# Patient Record
Sex: MALE | Race: WHITE | NOT HISPANIC OR LATINO | Employment: FULL TIME | URBAN - METROPOLITAN AREA
[De-identification: names, ages, dates, MRNs, and addresses within clinical notes are randomized per-mention and may not be internally consistent; named-entity substitution may affect disease eponyms.]

---

## 2017-06-09 ENCOUNTER — GENERIC CONVERSION - ENCOUNTER (OUTPATIENT)
Dept: OTHER | Facility: OTHER | Age: 52
End: 2017-06-09

## 2017-11-10 ENCOUNTER — GENERIC CONVERSION - ENCOUNTER (OUTPATIENT)
Dept: OTHER | Facility: OTHER | Age: 52
End: 2017-11-10

## 2017-11-16 ENCOUNTER — GENERIC CONVERSION - ENCOUNTER (OUTPATIENT)
Dept: OTHER | Facility: OTHER | Age: 52
End: 2017-11-16

## 2017-11-22 RX ORDER — SIMVASTATIN 10 MG
10 TABLET ORAL
COMMUNITY

## 2017-11-27 RX ORDER — IRBESARTAN 150 MG/1
150 TABLET ORAL
COMMUNITY

## 2017-11-28 ENCOUNTER — GENERIC CONVERSION - ENCOUNTER (OUTPATIENT)
Dept: OTHER | Facility: OTHER | Age: 52
End: 2017-11-28

## 2017-11-28 ENCOUNTER — ANESTHESIA (OUTPATIENT)
Dept: PERIOP | Facility: HOSPITAL | Age: 52
End: 2017-11-28
Payer: COMMERCIAL

## 2017-11-28 ENCOUNTER — ANESTHESIA EVENT (OUTPATIENT)
Dept: PERIOP | Facility: HOSPITAL | Age: 52
End: 2017-11-28
Payer: COMMERCIAL

## 2017-11-28 ENCOUNTER — HOSPITAL ENCOUNTER (OUTPATIENT)
Facility: HOSPITAL | Age: 52
Setting detail: OUTPATIENT SURGERY
Discharge: HOME | End: 2017-11-28
Attending: INTERNAL MEDICINE | Admitting: INTERNAL MEDICINE
Payer: COMMERCIAL

## 2017-11-28 VITALS
TEMPERATURE: 97.8 F | OXYGEN SATURATION: 94 % | RESPIRATION RATE: 20 BRPM | HEART RATE: 58 BPM | BODY MASS INDEX: 27.36 KG/M2 | DIASTOLIC BLOOD PRESSURE: 86 MMHG | SYSTOLIC BLOOD PRESSURE: 127 MMHG | HEIGHT: 72 IN | WEIGHT: 202 LBS

## 2017-11-28 RX ORDER — SODIUM CHLORIDE, SODIUM LACTATE, POTASSIUM CHLORIDE, CALCIUM CHLORIDE 600; 310; 30; 20 MG/100ML; MG/100ML; MG/100ML; MG/100ML
50 INJECTION, SOLUTION INTRAVENOUS CONTINUOUS
Status: DISCONTINUED | OUTPATIENT
Start: 2017-11-28 | End: 2017-11-28 | Stop reason: HOSPADM

## 2017-11-28 RX ORDER — PROPOFOL 10 MG/ML
INJECTION, EMULSION INTRAVENOUS AS NEEDED
Status: DISCONTINUED | OUTPATIENT
Start: 2017-11-28 | End: 2017-11-28 | Stop reason: SURG

## 2017-11-28 RX ADMIN — PROPOFOL 40 MG: 10 INJECTION, EMULSION INTRAVENOUS at 10:00

## 2017-11-28 RX ADMIN — PROPOFOL 50 MG: 10 INJECTION, EMULSION INTRAVENOUS at 09:54

## 2017-11-28 RX ADMIN — PROPOFOL 50 MG: 10 INJECTION, EMULSION INTRAVENOUS at 10:03

## 2017-11-28 RX ADMIN — PROPOFOL 100 MG: 10 INJECTION, EMULSION INTRAVENOUS at 09:51

## 2017-11-28 RX ADMIN — SODIUM CHLORIDE, SODIUM LACTATE, POTASSIUM CHLORIDE, AND CALCIUM CHLORIDE: .6; .31; .03; .02 INJECTION, SOLUTION INTRAVENOUS at 09:48

## 2017-11-28 RX ADMIN — PROPOFOL 50 MG: 10 INJECTION, EMULSION INTRAVENOUS at 09:57

## 2017-11-28 RX ADMIN — SODIUM CHLORIDE, SODIUM LACTATE, POTASSIUM CHLORIDE, AND CALCIUM CHLORIDE 50 ML/HR: .6; .31; .03; .02 INJECTION, SOLUTION INTRAVENOUS at 09:22

## 2017-11-28 NOTE — OP NOTE
**** GI/ENDOSCOPY REPORT ****     PATIENT NAME: Nuris Velázquez ------ VISIT ID:  Patient ID:   [id number] YOB: 1965     INTRODUCTION: Colonoscopy - A 50 male patient presents for an outpatient   Colonoscopy at [address] [place] [address]  PREVIOUS COLONOSCOPY: none     INDICATIONS: Fecal occult blood positive  CONSENT:  The benefits, risks, and alternatives to the procedure were   discussed and informed consent was obtained from the patient  PREPARATION: EKG, pulse, pulse oximetry and blood pressure were monitored   throughout the procedure  The patient was identified by myself both   verbally and by visual inspection of ID band  Airway Assessment   Classification: Airway class 2 - Visualization of the soft palate, fauces   and uvula  ASA Classification: Class 2 - Patient has mild to moderate   systemic disturbance that may or may not be related to the disorder   requiring surgery  MEDICATIONS: Anesthesia-check records     PROCEDURE:  The endoscope was passed without difficulty through the anus   under direct visualization and advanced to the cecum, confirmed by   appendiceal orifice and ileocecal valve  The scope was withdrawn and the   mucosa was carefully examined  The quality of the preparation was   excellent  Cecal Intubation Time: 3 minutes(s) Scope Withdrawal Time: 4   minutes(s)     RECTAL EXAM: Normal rectal exam      FINDINGS:  The colonoscopy examination was completely normal  Normal   retroversion     COMPLICATIONS: There were no complications  IMPRESSIONS: Normal colonoscopy  RECOMMENDATIONS: Colonoscopy recommended in 10 years  Yearly FIT test with   PCP Every 3 year Cologuard with PCP     ESTIMATED BLOOD LOSS: None       PATHOLOGY SPECIMENS: No     PROCEDURE CODES: Colonoscopy     ICD-9 Codes: 792 1 Nonspecific abnormal findings in stool contents     ICD-10 Codes: R19 5 Other fecal abnormalities     PERFORMED BY: ANGEL Kennedy  [facility]  on 11/28/2017  Version 1, electronically signed by ANGEL Jin , D O  on   11/28/2017 at 10:10

## 2017-11-28 NOTE — DISCHARGE INSTRUCTIONS
Upper Endoscopy   AMBULATORY CARE:   What you need to know about an upper endoscopy: An upper endoscopy is also called an upper gastrointestinal (GI) endoscopy, or an esophagogastroduodenoscopy (EGD)  A scope (thin, flexible tube with a light and camera) is used to examine the walls of your upper intestines  The upper intestines include the esophagus, stomach, and duodenum (first part of the small intestine)  An upper endoscopy is used to look for problems, such as bleeding, polyps, ulcers, or infection  How to prepare for an upper endoscopy: Your healthcare provider will talk to you about how to prepare for your procedure  You may need to not eat or drink anything except water for 6 to 12 hours before the procedure  He will tell you what medicines to take or not take on the day of your procedure  Arrange to have someone drive you home  What will happen during an upper endoscopy:   · You will be given medicine through your IV to help you relax and make you drowsy  You will also be given medicine to numb your throat  You may need to wear a plastic mouthpiece to help hold your mouth open and protect your teeth and tongue  Your healthcare provider will gently insert the endoscope through your mouth and down into your throat  You may be asked to swallow once to help move the scope into your upper intestines  You may feel pressure in your throat but you should not feel pain  The endoscope does not restrict your breathing  · Your healthcare provider will watch the scope on a monitor  He will take pictures with the scope  He may gently inject air so he can see your digestive tract clearly  Your healthcare provider may take tissue samples and send them to the lab for tests  He may remove foreign objects, tumors, or polyps that may be blocking your upper intestines  Your healthcare provider may also insert tools with the scope to treat bleeding or place a stent (tube)   When the procedure is finished, the endoscope will be slowly removed  What will happen after an upper endoscopy: You may feel bloated, gassy, or have some abdominal discomfort  Your throat may be sore for 24 to 36 hours after the procedure  You may burp or pass gas from air that is still inside your body after your procedure  You may need to take short walks to help move the gas out  Eat small meals, if you feel bloated  Do not drive or make important decisions until the day after your procedure  Risks of an upper endoscopy: Your esophagus, stomach, or duodenum may be punctured or torn during the procedure  This is because of increased pressure as the scope and air are passing through  You may bleed more than expected or get an infection  You may have a slow or irregular heartbeat, or low blood pressure  This can cause sweating and fainting  Fluid may enter your lungs and you may have trouble breathing  These problems can be life-threatening  Call 911 if:   · You have sudden chest pain or trouble breathing  Seek care immediately if:   · You feel dizzy or faint  · You have trouble swallowing  · You have severe throat pain  · Your bowel movements are very dark or black  · Your abdomen is hard and firm and you have severe pain  · You vomit blood  Contact your healthcare provider if:   · You feel full or bloated and cannot burp or pass gas  · You have not had a bowel movement for 3 days after your procedure  · You have neck pain  · You have a fever or chills  · You have nausea or are vomiting  · You have a rash or hives  · You have questions or concerns about your endoscopy  Relieve a sore throat:  Suck on throat lozenges or crushed ice  Gargle with a small amount of warm salt water  Mix 1 teaspoon of salt and 1 cup of warm water to make salt water  Relieve gas and discomfort from bloating:  Lie on your right side with a heating pad on your abdomen  Take short walks to help pass gas   Eat small meals until bloating is relieved  Rest after your procedure: You have been given medicine to relax you  Do not  drive or make important decisions until the day after your procedure  Return to your normal activity as directed  You can usually return to work the day after your procedure  Follow up with your healthcare provider as directed:  Write down your questions so you remember to ask them during your visits  © 2017 [address] [employer] Information is for End User's use only and may not be sold, redistributed or otherwise used for commercial purposes  All illustrations and images included in CareNotes® are the copyrighted property of A [employer]  or [employer]  The above information is an  only  It is not intended as medical advice for individual conditions or treatments  Talk to your doctor, nurse or pharmacist before following any medical regimen to see if it is safe and effective for you  Colonoscopy   WHAT YOU NEED TO KNOW:   What do I need to know about a colonoscopy? A colonoscopy is a procedure to examine the inside of your colon (intestine) with a scope  A scope is a flexible tube with a small light and camera on the end  Polyps or tissue growths may be removed during your colonoscopy  How should I prepare the week before my colonoscopy? You will need to stop taking medicines that contain aspirin or iron for 7 days before your colonoscopy  If you take anticoagulants, such as warfarin, ask when you should stop taking it  Make plans for someone to drive you home after your procedure  How should I prepare the day before my colonoscopy? Your healthcare provider will have you prepare your bowels before your procedure  Your bowels will need to be empty before your procedure to allow him to clearly see your colon  You will need to do the following the day before your procedure:  · Have only clear liquids  for the entire day before your colonoscopy   Clear liquid diet includes clear fruit juices and broths, clear flavored gelatin, and hard candy  It also includes coffee, tea, carbonated beverages, and clear sports drinks  · Follow your bowel prep as directed  There are many different preparations that can be given before a colonoscopy  Some are given over 2 hours and others over 6 hours  Some are given earlier in the afternoon the day before the colonoscopy  Others are given the day before and then the morning of the colonoscopy  With any bowel prep, stay close to the bathroom  This liquid will cause your bowels to move frequently  · An enema  may be needed  Your healthcare provider may tell you to use an enema to help clean out your bowels  · Do not eat or drink anything after midnight  This will help prevent problems that can happen if you vomit while under anesthesia  What will happen during my colonoscopy? · You will be given medicine to help you relax  You will lie on your left side and raise one or both knees toward your chest  Your healthcare provider will examine your anus and use a finger to check your rectum  You may need another enema if your bowel is not empty  The scope will be lubricated and gently placed into your anus  It will then be passed through your rectum and into your colon  Water or air will be put into your colon to help clean or expand it  This is done so your healthcare provider can see your colon clearly  · Tissue samples may be taken from the walls of your bowel and sent to a lab for tests  If you have a polyp, your healthcare provider will pass a wire loop through the scope and use it to hold the polyp  The polyp is then burned or cut off the wall of your colon  Removed polyps are sent to a lab for tests  Pictures of your colon may be taken during the procedure  The scope will be removed when the procedure is done  What will happen after my colonoscopy? · Rest after your procedure   You may feel bloated, have some gas and abdominal discomfort  You may need to lie on your right side with a heating pad on your abdomen  You may need to take short walks to help move the gas out  Eat small meals, if you feel bloated  Do not drive or make important decisions until the day after your procedure  · You may have polyps removed  Do not take aspirin or go on long car trips for 7 days after your procedure  Ask your healthcare provider about any other limits after your procedure  What are the risks of a colonoscopy? You may have pain or bleeding after the scope or polyps are removed  You may also have a slow heartbeat, decreased blood pressure, or increased sweating  Your colon may tear due to the increased pressure from the scope and other instruments  This may cause bowel contents to leak out of your colon and into your abdomen  If this happens, you will need to stay in the hospital and have surgery on your colon  CARE AGREEMENT:   You have the right to help plan your care  Learn about your health condition and how it may be treated  Discuss treatment options with your caregivers to decide what care you want to receive  You always have the right to refuse treatment  The above information is an  only  It is not intended as medical advice for individual conditions or treatments  Talk to your doctor, nurse or pharmacist before following any medical regimen to see if it is safe and effective for you  © 2017 [address] [employer] is for End User's use only and may not be sold, redistributed or otherwise used for commercial purposes  All illustrations and images included in CareNotes® are the copyrighted property of [employer]  or [employer]

## 2017-11-28 NOTE — ANESTHESIA POSTPROCEDURE EVALUATION
Post-Op Assessment Note      CV Status:  Stable    Mental Status:  Lethargic    Hydration Status:  Stable    PONV Controlled:  None    Airway Patency:  Patent and adequate    Post Op Vitals Reviewed: Yes          Staff: Anesthesiologist, CRNA           /73 (11/28/17 1008)    Temp     Pulse 68 (11/28/17 1008)   Resp 16 (11/28/17 1008)    SpO2 96 % (11/28/17 1008)

## 2017-11-28 NOTE — ANESTHESIA PREPROCEDURE EVALUATION
Review of Systems/Medical History  Patient summary reviewed  Chart reviewed      Cardiovascular  Negative cardio ROS Hyperlipidemia, Hypertension controlled,    Pulmonary  Negative pulmonary ROS ,        GI/Hepatic  Negative GI/hepatic ROS          Negative  ROS        Endo/Other  Negative endo/other ROS      GYN  Negative gynecology ROS          Hematology  Negative hematology ROS      Musculoskeletal  Negative musculoskeletal ROS        Neurology  Negative neurology ROS      Psychology   Negative psychology ROS            Physical Exam    Airway    Mallampati score: II  TM Distance: >3 FB  Neck ROM: full     Dental   No notable dental hx     Cardiovascular  Comment: Negative ROS, Rhythm: regular, Rate: normal, Cardiovascular exam normal    Pulmonary  Pulmonary exam normal Breath sounds clear to auscultation,     Other Findings        Anesthesia Plan  ASA Score- 2       Anesthesia Type- IV sedation with anesthesia with ASA Monitors  Additional Monitors:   Airway Plan:           Induction- intravenous  Informed Consent- Anesthetic plan and risks discussed with patient

## 2017-11-28 NOTE — OP NOTE
**** GI/ENDOSCOPY REPORT ****     PATIENT NAME: Jude Diaz - VISIT ID:  Patient ID: [id number]   YOB: 1965     INTRODUCTION: Esophagogastroduodenoscopy - A 50 male patient presents for   an outpatient Esophagogastroduodenoscopy at [facility]  INDICATIONS: Fecal occult blood positive  CONSENT: The benefits, risks, and alternatives to the procedure were   discussed and informed consent was obtained from the patient  PREPARATION:  EKG, pulse, pulse oximetry and blood pressure were monitored   throughout the procedure  MEDICATIONS:asa 2     PROCEDURE:  The endoscope was passed without difficulty through the mouth   under direct visualization and advanced to the 3rd portion of the   duodenum  The scope was withdrawn and the mucosa was carefully examined  FINDINGS:  The EGD examination was completely normal   Esophagus: The   esophagus appeared to be normal  The Z line was visualized at 41 cm from   the entry site  Stomach: The antrum, body of the stomach, cardia, fundus,   incisura, and pylorus appeared to be normal   Duodenum: The duodenal bulb,   2nd portion of the duodenum, and 3rd portion of the duodenum appeared to   be normal      COMPLICATIONS: There were no complications  IMPRESSIONS: Normal EGD  Normal esophagus  Z line visualized  Normal   antrum, body of the stomach, cardia, fundus, incisura, and pylorus  Normal   duodenal bulb, 2nd portion of the duodenum, and 3rd portion of the   duodenum  RECOMMENDATIONS: Continue current medications  ESTIMATED BLOOD LOSS: None  PATHOLOGY SPECIMENS: No     PROCEDURE CODES: 15256 - EGD flexible; incl brushing or washing     ICD-9 Codes: 792 1 Nonspecific abnormal findings in stool contents     ICD-10 Codes: R19 5 Other fecal abnormalities     PERFORMED BY: ANGEL Mccall  [facility]  on 11/28/2017    Version 1, electronically signed by ANGEL Noble , D O  on   11/28/2017 at 09:59

## 2017-12-21 ENCOUNTER — GENERIC CONVERSION - ENCOUNTER (OUTPATIENT)
Dept: OTHER | Facility: OTHER | Age: 52
End: 2017-12-21

## 2018-01-22 VITALS
WEIGHT: 206 LBS | SYSTOLIC BLOOD PRESSURE: 116 MMHG | HEIGHT: 72 IN | BODY MASS INDEX: 27.9 KG/M2 | DIASTOLIC BLOOD PRESSURE: 70 MMHG

## 2022-08-16 ENCOUNTER — TELEPHONE (OUTPATIENT)
Dept: DERMATOLOGY | Facility: CLINIC | Age: 57
End: 2022-08-16

## 2022-08-16 NOTE — TELEPHONE ENCOUNTER
Pre- operative Mohs Telephone Scheduling Note    Do you have a pacemaker or defibrillator? no    Do you take antibiotics before skin or dental procedures? no  If yes, will likely require pre-operative antibiotics  Ask  the patient why they take the antibiotics (usually because of joint replacement)  Do you have a history of a joint replacements within the past 2 years? no   If yes, will likely require pre-operative antibiotics  Ask if orthopaedic surgeon has prescribed pre-operative antibiotics to take before procedures/dental work? Do you take any OTC medications that thin your blood (Aspirin, Aleve, Ibuprofen) or supplements that thin your blood (fish oil, garlic, vitamin E, Ginko Biloba)? no    Do you take any prescribed medications that thin your blood (Coumadin, Plavix, Xarelto, Eliquis or another prescribed blood thinner)? no    Do you have an allergy to lidocaine or epinephrine? no    Do you have an allergy to shellfish? no    Do you smoke? no      If yes,  patient to try and stop 2 days before surgery and 7 days after the surgery  Minimizing smoking as much as possible during this time will improve healing and the cosmetic result after surgery  Date scheduled: 10/03/2022 with Dr Smitha Thompson @9:30 am [facility]  Left superior medial malar cheek      Coordination of Care with other provider (Oculoplastics, Plastics, ENT) required? no Patient refused plastic     IF YES, PLEASE FORWARD TO APPROPRIATE PERSONNEL TO HELP COORDINATE  Are there remaining tumors to be scheduled? no    Was Prior Authorization obtained? No (please use  [email or web address] to document prior auth)      PRIOR AUTHORIZATION FORM          TAX YO#[id number]    Vicente Martin (DLQ#[id number])   ? Ngozi Beverage (EIU#[id number])       ? Pricila Fitzgerald (EXP#[id number])    MOHS Procedure:    X 38389 head, neck , hands, feet, genitalia (include  for each additional stage)     ? 23486 trunk, arms, legs (include 10280 for each additional stage) Repair CPT BLBE:[id number] (intermediate closure)    ? Diagnosis/ICD code: 800 Julien Mo left superior medial malar cheek         Primary Insurance:   [address] [place] [address]     Phone#: [phone]    Member MJ#:[id number]     Secondary insurance:     Phone#:    Member ID#:    ?Need Authorization: No                            ?Needs Referral: No    Reference#: [id number]

## 2022-09-26 NOTE — TELEPHONE ENCOUNTER
Returned call to pt to confirm MOHS, provide location and inform PA completed, informed pt no precautions day of procedure - can eat/drink as normal

## 2022-10-03 ENCOUNTER — PROCEDURE VISIT (OUTPATIENT)
Dept: DERMATOLOGY | Facility: CLINIC | Age: 57
End: 2022-10-03
Payer: COMMERCIAL

## 2022-10-03 VITALS
TEMPERATURE: 97.8 F | RESPIRATION RATE: 16 BRPM | WEIGHT: 197 LBS | HEART RATE: 66 BPM | DIASTOLIC BLOOD PRESSURE: 74 MMHG | BODY MASS INDEX: 26.68 KG/M2 | SYSTOLIC BLOOD PRESSURE: 134 MMHG | HEIGHT: 72 IN

## 2022-10-03 DIAGNOSIS — C44.319 BASAL CELL CARCINOMA (BCC) OF LEFT MEDIAL CHEEK: ICD-10-CM

## 2022-10-03 PROCEDURE — 17311 MOHS 1 STAGE H/N/HF/G: CPT | Performed by: DERMATOLOGY

## 2022-10-03 PROCEDURE — 12051 INTMD RPR FACE/MM 2.5 CM/<: CPT | Performed by: DERMATOLOGY

## 2022-10-03 NOTE — PATIENT INSTRUCTIONS
Mohs Microscopic Surgery After Care    WOUND CARE AFTER SURGERY:    Do NOT to remove the pressure bandage for 24 hours  Keep the area clean and dry while this bandage is on  After removing the bandage for the first time, gently clean the area with soap and water  If the bandage is difficult to remove, getting the bandage wet in the shower will sometimes help soften the adhesive and allow it to be removed more easily  You will now need to cleanse this area daily in the shower with gentle soap  There is no need to scrub the area  You will need to apply plain Vaseline ointment (this is over the counter and not a prescription) to the site for up to 2 weeks followed by a clean appropriately sized bandage to area  Non stick dressing and paper tape (or Hypafix) are recommended for sensitive skin but a bandaid is fine if it covers the area well  All your stitches will dissolve over the next two weeks  You will need to keep these moist with Vaseline and covered with a bandage over the next 2 weeks for them to dissolve appropriately  RESTRICTIONS:     For two DAYS:   - You will need to take it very easy as this time is highest risk for bleeding  Being a "couch potato" during these two days is generally recommended  - For surgeries on the face/neck/scalp: Avoid leaning down to pick things up off the floor as this brings blood up to your head  Instead, squat down to pick things up  For two WEEKS:   - No heavy lifting (anything greater than 10 pounds)   - You can start to do slow, gentle activities such as slow walking but nothing to increase your heart rate and blood pressure too much (such as cardiovascular exercise)  It is important to take it easy as there is still a risk for bleeding and a high risk popping of stitches open during this time  If we did surgery near the eyes (including the nose, forehead, front part of your scalp, cheeks):  It is VERY common to get a large amount of swelling around your eyes (puffy eyes)  Although less frequent, this can be enough to swell your eyes shut and can also come along with bruising  This should not hurt and is very expected and normal  It is typically worst at ~ 3 days out from your surgery and dramatically better 1 week post-operatively  If we did surgery around your nose: No blowing your nose as this puts you at higher risk of popping stitches durign this time  Instead dab under your nose with a tissue or use a Q-tip inside your nose  If we did surgery on the skin above or bellow your lip or your lip itself: No sipping from straws as this uses a lot of the muscles around your mouth and increases the risk of popping stitches during this time  MANAGING YOUR PAIN AFTER SURGERY     You can expect to have some pain after surgery  This is normal  The pain is typically worse the first two days after surgery, and quickly begins to get better  The best strategy for controlling your pain after surgery is around the clock pain control  You can take over the counter Acetaminophen (Tylenol) for discomfort, if no contraindications  If you are taking this at the maximum dose, you can alternate this with Motrin (ibuprofen or Advil) as well  Alternating these medications with each other allows you to maximize your pain control  In addition to Tylenol and Motrin, you can use heating pads or ice packs on your incisions to help reduce your pain  How will I alternate your regular strength over-the-counter pain medication? You will take a dose of pain medication every three hours  Start by taking 650 mg of Tylenol (2 pills of 325 mg)   3 hours later take 600 mg of Motrin (3 pills of 200 mg)   3 hours after taking the Motrin take 650 mg of Tylenol   3 hours after that take 600 mg of Motrin      See example - if your first dose of Tylenol is at 12:00 PM     12:00 PM  Tylenol 650 mg (2 pills of 325 mg)    3:00 PM  Motrin 600 mg (3 pills of 200 mg)    6:00 PM  Tylenol 650 mg (2 pills of 325 mg)    9:00 PM  Motrin 600 mg (3 pills of 200 mg)    Continue alternating every 3 hours      Important:   Do not take more than 4000mg of Tylenol or 3200mg of Motrin in a 24-hour period  What if I still have pain? If you have pain that is not controlled with the over-the-counter pain medications (Tylenol and Motrin or Advil), don't hesitate to call our staff using the number provided  We will help make sure you are managing your pain in the best way possible, and if necessary, we can provide a prescription for additional pain medication  CALL OUR OFFICE IMMEDIATELY FOR ANY SIGNS OF INFECTION:    This includes fever, chills, increased redness, warmth, tenderness, severe discomfort/pain, or pus or foul smell coming from the wound  [facility] directly at [phone] (SKIN)    IF BLEEDING IS NOTICED:    Place a clean cloth over the area and apply firm pressure for thirty minutes  Check the wound ONLY after 30 minutes of direct pressure; do not cheat and sneak a peak, as that does not count  If bleeding persists after 30 minutes of legitimate direct pressure, then try one more round of direct pressure to the area  Should the bleeding become heavier or not stop after the second attempt, call [facility] directly at [phone] (SKIN)  Your call will get routed to the dermatology surgeon on call even after hours

## 2022-10-03 NOTE — PROGRESS NOTES
MOHS Procedure Note    Patient: Prosper Irizarry  :   MRN: [id number]  Date: 10/3/2022    History of Present Illness: The patient is a 56 y o  male who presents with complaints of basal cell carcinoma of the left superior medial malar cheek  Past Medical History:   Diagnosis Date    Basal cell carcinoma 2022    Left superior medial malar cheek    Hyperlipidemia     Hypertension        Past Surgical History:   Procedure Laterality Date    HAND SURGERY      MOHS SURGERY  10/03/2022    Left superior medail malar cheek-Dr Mehdi Gutierrez    [place] ESOPHAGOGASTRODUODENOSCOPY TRANSORAL DIAGNOSTIC N/A 2017    Procedure: EGD AND COLONOSCOPY;  Surgeon: Felipa Ellis MD;  Location: [facility]; Service: Gastroenterology    SKIN BIOPSY           Current Outpatient Medications:     irbesartan (AVAPRO) 150 mg tablet, Take 150 mg by mouth daily at bedtime, Disp: , Rfl:     simvastatin (ZOCOR) 10 mg tablet, Take 10 mg by mouth daily at bedtime, Disp: , Rfl:     No Known Allergies    Physical Exam:   Vitals:    10/03/22 0903   BP: 134/74   Pulse: 66   Resp: 16   Temp: 97 8 °F (36 6 °C)     General: Awake, Alert, Oriented x 3, Mood and affect appropriate  Respiratory: Respirations even and unlabored  Cardiovascular: Peripheral pulses intact; no edema  Musculoskeletal Exam: N/A    Assessment: 0 8 cm x 0 4 cm pink macule, biopsy confirmed basal cell carcinoma    Plan:  Mohs    MOHS Procedure Timeout    Flowsheet Row Most Recent Value   Timeout: 8153   Patient Identity Verified: Yes   Correct Site Verified: Yes   Correct Procedure Verified: Yes          MOHS Diagnosis/Indication/Location/ID    Flowsheet Row Most Recent Value   Pathology Type Basal cell carcinoma   Anatomic Site --  [Left superior medail malar cheek]   Indications for MOHS tumor location, histologic pattern   MOHS ID [id number]          MOHS Site/Accession/Pre-Post    Flowsheet Row Most Recent Value   Original Site Identified (as submitted by referring clinician) Referral, Photo   Biopsy Accession/Specimen # (as submitted by referring clincian) [id number]   Pre-MOHS Size Length (cm) 0 8   Pre-MOHS Size Width (cm) 0 4   Post-MOHS Size-Length (cm) 0 6   Post MOHS Size-Width (cm) 1 2   Repair Type Intermediate layered closure   Suture Type Fast absorbing gut, Monocryl plus   Fast Absorbing Suture Size 5   Monocryl Plus Suture Size 5   Final repair length (cm): 2   Anesthetic Used 1% Lidocaine with epinephrine  [with Bicarb]          MOHS Tumor Stage 1 Information    Flowsheet Row Most Recent Value   Tissue Sections (blocks) 2   Microscopic Exam Section 1: No tumor identified in section  Microscopic Exam Section 2: No tumor identified in section  Tumor Clear After Stage I? Yes                      Patient identified, procedure verified, site identified and verified  Time out completed  Surgical removal of the lesion discussed with the patient (risks and benefits, including possibility of scarring, infection, recurrence or potential for further treatment)  I have specifically identified the site with the patient  I have discussed the fact that the patient will have a scar after the procedure regardless of granulation or repair with sutures  I have discussed that the repair options can range from granulation in some cases to linear or curvilinear closures to larger flaps or grafts  There are sometimes flaps or grafts used that require multiples stages of surgery and will not be completed today, rather be completed over a series of appointments  I have discussed that occasionally due to location, size or depth of the lesion I may recommend consultation with and transfer of care for further removal or the reconstruction to another provider such as ophthalmology surgery, plastic surgery, ENT surgery, or surgical oncology   There are cases in which other testing such as imaging with MRI or CT scan or testing of lymph nodes is recommended because of the nature/depth/location of tumor seen during the removal  There is a risk of injury to nerves causing temporary or permanent numbness or the inability to move muscles full such as the inability to lift eyebrows  Questions answered and verbal and written consent was obtained  The tumor qualifies for Mohs based on AUC criteria  Dr José Miguel Pascual served as the surgeon and pathologist during the procedure  With the patient in the supine position and under adequate local anesthesia with 1% lidocaine with epinephrine 1:100,000, the defect was scrubbed with Hibiclens  Sterile drapes were placed from the sterile tray  Because of the location of the surgical defect, an intermediate closure was judged to give the best possible cosmetic and functional result  The edges of the defect were carefully debrided removing any dead or coagulated tissue  Hemostasis was obtained by pinpoint electrocoagulation  Careful planning of removal of redundant tissue at either end of the defect was drawn out so that the suture lines would fall in the optimal orientation with regard to the relaxed skin tension lines  These were then removed with a #15 blade scalpel  The wound was then approximated by a deep layer of buried vertical mattress sutures and the cutaneous margins were approximated and closed by superficial sutures as noted above  Estimated blood loss was less than 5 mL  The patient tolerated the procedure well  The wound was dressed with petrolatum, a non-stick pad, and a compression dressing  Gurwinder Aquino served as the surgeon and pathologist during the procedure  Postoperative care: Wound care discussed at length  I urged the patient to call us if any problems or question should arise  Complications: none  Post-op medications: none  Patient condition after procedure: stable    BCC cleared with 1 stage of mohs and repaired with 2cm closure  Well tolerated    S/R not needed as all dissolving sutures used     Scribe Attestation    I,:  Terrance Stein MA am acting as a scribe while in the presence of the attending physician :       I,:  Atiya Alan MD personally performed the services described in this documentation    as scribed in my presence :